# Patient Record
Sex: FEMALE | Race: WHITE | NOT HISPANIC OR LATINO | Employment: FULL TIME | URBAN - METROPOLITAN AREA
[De-identification: names, ages, dates, MRNs, and addresses within clinical notes are randomized per-mention and may not be internally consistent; named-entity substitution may affect disease eponyms.]

---

## 2018-01-12 ENCOUNTER — OFFICE VISIT (OUTPATIENT)
Dept: MEDICAL GROUP | Facility: MEDICAL CENTER | Age: 53
End: 2018-01-12
Payer: COMMERCIAL

## 2018-01-12 VITALS
TEMPERATURE: 97.4 F | RESPIRATION RATE: 16 BRPM | DIASTOLIC BLOOD PRESSURE: 68 MMHG | OXYGEN SATURATION: 100 % | SYSTOLIC BLOOD PRESSURE: 100 MMHG | HEART RATE: 64 BPM | WEIGHT: 128 LBS | HEIGHT: 66 IN | BODY MASS INDEX: 20.57 KG/M2

## 2018-01-12 DIAGNOSIS — E55.9 VITAMIN D DEFICIENCY: ICD-10-CM

## 2018-01-12 DIAGNOSIS — Z13.1 SCREENING FOR DIABETES MELLITUS: ICD-10-CM

## 2018-01-12 DIAGNOSIS — E78.5 DYSLIPIDEMIA: ICD-10-CM

## 2018-01-12 DIAGNOSIS — B00.1 RECURRENT COLD SORES: ICD-10-CM

## 2018-01-12 DIAGNOSIS — F34.1 DYSTHYMIA: ICD-10-CM

## 2018-01-12 DIAGNOSIS — Z13.29 SCREENING FOR THYROID DISORDER: ICD-10-CM

## 2018-01-12 DIAGNOSIS — Z12.11 SCREENING FOR COLON CANCER: ICD-10-CM

## 2018-01-12 DIAGNOSIS — Z13.0 SCREENING FOR DEFICIENCY ANEMIA: ICD-10-CM

## 2018-01-12 PROCEDURE — 99204 OFFICE O/P NEW MOD 45 MIN: CPT | Performed by: FAMILY MEDICINE

## 2018-01-12 RX ORDER — CITALOPRAM 40 MG/1
40 TABLET ORAL DAILY
Qty: 90 TAB | Refills: 3 | Status: SHIPPED | OUTPATIENT
Start: 2018-01-12 | End: 2019-01-06 | Stop reason: SDUPTHER

## 2018-01-12 RX ORDER — MINOCYCLINE HYDROCHLORIDE 50 MG/1
50 TABLET ORAL 2 TIMES DAILY
COMMUNITY
End: 2018-03-16 | Stop reason: SDUPTHER

## 2018-01-12 RX ORDER — VALACYCLOVIR HYDROCHLORIDE 1 G/1
1000 TABLET, FILM COATED ORAL 2 TIMES DAILY
COMMUNITY
End: 2018-01-12 | Stop reason: SDUPTHER

## 2018-01-12 RX ORDER — VALACYCLOVIR HYDROCHLORIDE 500 MG/1
500 TABLET, FILM COATED ORAL 2 TIMES DAILY
Qty: 12 TAB | Refills: 1 | Status: SHIPPED | OUTPATIENT
Start: 2018-01-12 | End: 2018-01-19 | Stop reason: SDUPTHER

## 2018-01-12 RX ORDER — CITALOPRAM 40 MG/1
40 TABLET ORAL DAILY
COMMUNITY
End: 2018-01-12 | Stop reason: SDUPTHER

## 2018-01-12 ASSESSMENT — PATIENT HEALTH QUESTIONNAIRE - PHQ9: CLINICAL INTERPRETATION OF PHQ2 SCORE: 0

## 2018-01-12 NOTE — ASSESSMENT & PLAN NOTE
Stable. Currently taking Citalopram 40 mg as prescribed.   Denies side effects and is tolerating well.  Mood is improved with current medication and therapy.    Patient denies SI/HI.  Depression Screen (PHQ-2/PHQ-9) 1/12/2018   PHQ-2 Total Score 0     She has been on for 10 years.  There is a strong FH of depression and suicide.  She has tried to wean and depression has returned.  Happy about her recent move from [place].

## 2018-01-12 NOTE — PATIENT INSTRUCTIONS
"Lipoproteins  Lipoproteins are transport carriers made of protein and different types of fat (cholesterol). In small amounts, cholesterol is important because it is used to form cell membranes and certain hormones. Cholesterol is also needed for other essential body functions. Cholesterol, which is a soft, waxy substance, does not mix with blood, which is watery. Cholesterol is transported in the blood via lipoproteins. High levels of certain lipoproteins can increase your risk of heart disease and stroke because they attach to, and build up on, artery walls. These fatty deposits make it difficult for blood to flow through the arteries.  There are 3 types of lipoproteins:  · Low Density liprotein (LDL):  · LDL or \"bad\" cholesterol carries cholesterol particles throughout the blood stream. LDL cholesterol builds up on the artery walls, making them hard and narrow. The more LDL cholesterol you have in your blood, the greater your risk of heart disease. LDL levels less than 100 mg/dL are optimal.  · High Density Lipoprotein (HDL):  · HDL or \"good\" cholesterol helps to protect against heart attack. Low levels of HDL (less than 40 mg/dL in men and less than 50 mg/dL in women) can increase the risk of heart disease. HDL attaches itself to excess cholesterol and takes it to the liver. From there, the excess cholesterol is processed and excreted from the body. An optimal HDL level is 60 mg/dL and above.  · Very Low Density Lipoprotein (VLDL):  · This type of cholesterol contains a specific type of fat (triglycerides). VLDL cholesterol makes LDL cholesterol particles larger and is considered a \"bad\" fat. A high level of VLDL can increase the risk of heart disease. VLDL levels less than 30 mg/dL are optimal.  DIAGNOSIS   The above levels are determined through blood testing. Your caregiver will draw your blood and look at your levels. If your LDL and VLDL levels are high and your HDL is low, your caregiver may " recommend:  · Medicine.  · Weight loss.  · A change in eating habits.  Immediate measures you can take are:  · Quitting smoking.  · Exercising. Excess weight can lead to numerous health problems.  · Eating a healthy diet. Focus on whole grain breads, lean meats, fresh fruits, and vegetables. Avoid fast food, fried food, and high fat food.  · Drinking alcohol only in moderation. A man should limit his alcoholic intake to 2 drinks a day. A woman should limit her alcoholic intake to 1 drink a day.  · Taking cholesterol lowering medicines as instructed (if prescribed). Make sure you follow up with your caregiver as instructed.  MAKE SURE YOU:   · Understand these instructions.  · Will watch your condition.  · Will get help right away if you are not doing well or get worse.  Document Released: 03/16/2010 Document Revised: 06/18/2013 Document Reviewed: 03/16/2010  [employer]® Patient Information ©2014 [employer].

## 2018-01-15 NOTE — PROGRESS NOTES
Chief Complaint   Patient presents with   • Establish Care         Rosalva Cummings is a 52 y.o. female here to establish care and for evaluation and management of:        HPI:    Dysthymia  Stable. Currently taking Citalopram 40 mg as prescribed.   Denies side effects and is tolerating well.  Mood is improved with current medication and therapy.    Patient denies SI/HI.  Depression Screen (PHQ-2/PHQ-9) 1/12/2018   PHQ-2 Total Score 0     She has been on for 10 years.  There is a strong FH of depression and suicide.  She has tried to wean and depression has returned.  Happy about [place] recent [place]        Recurrent cold sores  Gets rare outbreaks and Valtrex works well. She would like a refill of this medication.    Dyslipidemia  Patient has a history of dyslipidemia. She is not taking medications for this but wishes to manage with diet and exercise if possible.    Vitamin D deficiency  Patient has known vitamin D deficiency. She has not been taking vitamin D supplements regularly. She denies any recent fractures.      Allergies   Allergen Reactions   • Amoxicillin Rash     Per patient   • Codeine        Current medicines (including changes today)  Current Outpatient Prescriptions   Medication Sig Dispense Refill   • minocycline (DYNACIN) 50 MG tablet Take 50 mg by mouth 2 times a day.     • citalopram (CELEXA) 40 MG Tab Take 1 Tab by mouth every day. 90 Tab 3   • valacyclovir (VALTREX) 500 MG Tab Take 1 Tab by mouth 2 times a day for 3 days. 12 Tab 1     No current facility-administered medications for this visit.      She  has a past medical history of Acne; Depression; and Ovarian cyst.  She  has a past surgical history that includes hernia repair (Right) and ovarian cystectomy (Right).  Social History   Substance Use Topics   • Smoking status: Never Smoker   • Smokeless tobacco: Never Used   • Alcohol use No     Social History     Social History Narrative   • No narrative on file     Family History  "  Problem Relation Age of Onset   • Stroke Mother    • Psychiatry Mother    • Lung Disease Father    • Alcohol/Drug Father    • Alcohol/Drug Brother    • Cancer Brother      leukemia     Family Status   Relation Status   • Mother    • Father    • Sister Alive   • Brother    • Brother Alive         ROS  No fever or chills.  No nausea or vomiting.  No chest pain or palpitations.  No cough or SOB.  No pain with urination or hematuria.  No black or bloody stools.  All other systems reviewed and are negative     Objective:     Blood pressure 100/68, pulse 64, temperature 36.3 °C (97.4 °F), resp. rate 16, height 1.676 m (5' 6\"), weight 58.1 kg (128 lb), SpO2 100 %. Body mass index is 20.66 kg/m².  Physical Exam:      Well developed, well nourished.  Alert, oriented in no acute distress.  Psych: Eye contact is good, speech goal directed, affect calm  Eyes: conjunctiva non-injected, sclera non-icteric.  Ears: Pinna normal. TM pearly gray.   Nose: Nares are patent.  Normal mucosa  Mouth: Oral mucous membranes pink and moist with no lesions.  Neck Supple.  No adenopathy or masses in the neck or supraclavicular regions. No thyromegaly  Lungs: clear to auscultation bilaterally with good excursion. No wheezes or rhonchi  CV: regular rate and rhythm. No murmur      Assessment and Plan:   The following treatment plan was discussed    1. Dysthymia  Stable. Continue Celexa 40 mg daily  - citalopram (CELEXA) 40 MG Tab; Take 1 Tab by mouth every day.  Dispense: 90 Tab; Refill: 3    2. Screening for colon cancer  FIT test given. Patient refuses colonoscopy  - OCCULT BLOOD FECES IMMUNOASSAY (FIT); Future    3. Recurrent cold sores  Valtrex refilled for occasional use  - valacyclovir (VALTREX) 500 MG Tab; Take 1 Tab by mouth 2 times a day for 3 days.  Dispense: 12 Tab; Refill: 1    4. Vitamin D deficiency  Check vitamin D level and adjust supplementation as needed  - VITAMIN D,25 HYDROXY; Future    5. " Dyslipidemia  Dietary counseling done. Check lipid panel  - LIPID PROFILE; Future    6. Screening for deficiency anemia  Screening labs ordered.  Await results for counseling.    - CBC WITH DIFFERENTIAL; Future    7. Screening for diabetes mellitus  Screening labs ordered.  Await results for counseling.    - COMP METABOLIC PANEL; Future    8. Screening for thyroid disorder  Screening labs ordered.  Await results for counseling.    - TSH; Future      Records requested.    Any change or worsening of signs or symptoms, patient encouraged to follow-up or report to the emergency room for further evaluation. Patient understands and agrees.    Followup: Return in about 1 year (around 1/12/2019).

## 2018-01-15 NOTE — ASSESSMENT & PLAN NOTE
Patient has a history of dyslipidemia. She is not taking medications for this but wishes to manage with diet and exercise if possible.

## 2018-01-15 NOTE — ASSESSMENT & PLAN NOTE
Patient has known vitamin D deficiency. She has not been taking vitamin D supplements regularly. She denies any recent fractures.

## 2018-01-19 DIAGNOSIS — B00.1 RECURRENT COLD SORES: ICD-10-CM

## 2018-01-20 RX ORDER — VALACYCLOVIR HYDROCHLORIDE 500 MG/1
500 TABLET, FILM COATED ORAL 2 TIMES DAILY
Qty: 12 TAB | Refills: 1 | Status: SHIPPED | OUTPATIENT
Start: 2018-01-20 | End: 2018-01-23

## 2018-01-25 ENCOUNTER — TELEPHONE (OUTPATIENT)
Dept: MEDICAL GROUP | Facility: MEDICAL CENTER | Age: 53
End: 2018-01-25

## 2018-01-25 DIAGNOSIS — B00.1 RECURRENT COLD SORES: ICD-10-CM

## 2018-01-25 RX ORDER — VALACYCLOVIR HYDROCHLORIDE 1 G/1
2000 TABLET, FILM COATED ORAL 2 TIMES DAILY
Qty: 12 TAB | Refills: 6 | Status: SHIPPED | OUTPATIENT
Start: 2018-01-25 | End: 2019-12-20

## 2018-01-25 NOTE — TELEPHONE ENCOUNTER
Pt called asked if you can resent the valtrex to the local pharmacy([facility]).  Please call pt when complete

## 2018-03-16 RX ORDER — MINOCYCLINE HYDROCHLORIDE 50 MG/1
50 TABLET ORAL 2 TIMES DAILY
Qty: 60 TAB | Refills: 3 | Status: SHIPPED | OUTPATIENT
Start: 2018-03-16 | End: 2018-03-21

## 2018-03-20 RX ORDER — MINOCYCLINE HYDROCHLORIDE 50 MG/1
CAPSULE ORAL
COMMUNITY
Start: 2018-01-02 | End: 2018-03-20 | Stop reason: SDUPTHER

## 2018-03-20 NOTE — TELEPHONE ENCOUNTER
Was the patient seen in the last year in this department? Yes     Does patient have an active prescription for medications requested? No     Received Request Via: Pharmacy     We received a letter from the pharmacy stating it is cheaper for patient to get cap then tab. Would like rx changed,

## 2018-03-21 RX ORDER — MINOCYCLINE HYDROCHLORIDE 50 MG/1
50 CAPSULE ORAL 2 TIMES DAILY
Qty: 180 CAP | Refills: 1 | Status: SHIPPED | OUTPATIENT
Start: 2018-03-21 | End: 2019-01-29 | Stop reason: SDUPTHER

## 2018-05-14 ENCOUNTER — OFFICE VISIT (OUTPATIENT)
Dept: MEDICAL GROUP | Facility: MEDICAL CENTER | Age: 53
End: 2018-05-14
Payer: COMMERCIAL

## 2018-05-14 VITALS
HEIGHT: 66 IN | BODY MASS INDEX: 20.25 KG/M2 | WEIGHT: 126 LBS | HEART RATE: 68 BPM | DIASTOLIC BLOOD PRESSURE: 70 MMHG | SYSTOLIC BLOOD PRESSURE: 86 MMHG | TEMPERATURE: 99.5 F | OXYGEN SATURATION: 98 %

## 2018-05-14 DIAGNOSIS — R59.0 CERVICAL LYMPHADENOPATHY: ICD-10-CM

## 2018-05-14 DIAGNOSIS — J30.1 ACUTE SEASONAL ALLERGIC RHINITIS DUE TO POLLEN: ICD-10-CM

## 2018-05-14 DIAGNOSIS — G44.52 NEW DAILY PERSISTENT HEADACHE: ICD-10-CM

## 2018-05-14 PROCEDURE — 99214 OFFICE O/P EST MOD 30 MIN: CPT | Performed by: NURSE PRACTITIONER

## 2018-05-14 RX ORDER — MELOXICAM 7.5 MG/1
7.5 TABLET ORAL DAILY
Qty: 14 TAB | Refills: 0 | Status: SHIPPED
Start: 2018-05-14 | End: 2020-06-09
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
## 2018-05-22 DIAGNOSIS — R59.0 CERVICAL LYMPHADENOPATHY: ICD-10-CM

## 2018-05-30 ENCOUNTER — HOSPITAL ENCOUNTER (OUTPATIENT)
Dept: RADIOLOGY | Facility: MEDICAL CENTER | Age: 53
End: 2018-05-30
Attending: NURSE PRACTITIONER
Payer: COMMERCIAL

## 2018-05-30 ENCOUNTER — TELEPHONE (OUTPATIENT)
Dept: MEDICAL GROUP | Facility: MEDICAL CENTER | Age: 53
End: 2018-05-30

## 2018-05-30 DIAGNOSIS — R59.0 CERVICAL LYMPHADENOPATHY: ICD-10-CM

## 2018-05-30 PROCEDURE — 76536 US EXAM OF HEAD AND NECK: CPT

## 2018-05-30 NOTE — LETTER
June 7, 2018        Rosalva Cummings  [address] [facility] [place] [address]        Dear Rosalva:    We attempted to contact you multiple times via phone and unfortunately, were unable to speak with you.    We wanted to inform you that your neck ultrasound came back within normal limits (normal.)      If you have any questions or concerns, please don't hesitate to call.        Sincerely,        Tracy Newton  Practice Supervisor     Electronically Signed

## 2018-09-12 ENCOUNTER — PATIENT MESSAGE (OUTPATIENT)
Dept: MEDICAL GROUP | Facility: MEDICAL CENTER | Age: 53
End: 2018-09-12

## 2018-09-12 DIAGNOSIS — F34.1 DYSTHYMIA: ICD-10-CM

## 2019-01-06 DIAGNOSIS — F34.1 DYSTHYMIA: ICD-10-CM

## 2019-01-09 RX ORDER — CITALOPRAM 40 MG/1
TABLET ORAL
Qty: 90 TAB | Refills: 1 | Status: SHIPPED | OUTPATIENT
Start: 2019-01-09 | End: 2019-08-27 | Stop reason: SDUPTHER

## 2019-02-07 ENCOUNTER — OFFICE VISIT (OUTPATIENT)
Dept: MEDICAL GROUP | Facility: LAB | Age: 54
End: 2019-02-07
Payer: COMMERCIAL

## 2019-02-07 VITALS
DIASTOLIC BLOOD PRESSURE: 64 MMHG | OXYGEN SATURATION: 100 % | RESPIRATION RATE: 20 BRPM | BODY MASS INDEX: 20.41 KG/M2 | HEART RATE: 56 BPM | HEIGHT: 66 IN | TEMPERATURE: 98 F | SYSTOLIC BLOOD PRESSURE: 104 MMHG | WEIGHT: 127 LBS

## 2019-02-07 DIAGNOSIS — Z12.11 SCREENING FOR COLON CANCER: ICD-10-CM

## 2019-02-07 DIAGNOSIS — Z12.31 ENCOUNTER FOR SCREENING MAMMOGRAM FOR BREAST CANCER: ICD-10-CM

## 2019-02-07 DIAGNOSIS — E78.5 DYSLIPIDEMIA: ICD-10-CM

## 2019-02-07 DIAGNOSIS — Z11.59 NEED FOR HEPATITIS C SCREENING TEST: ICD-10-CM

## 2019-02-07 DIAGNOSIS — F34.1 DYSTHYMIA: ICD-10-CM

## 2019-02-07 DIAGNOSIS — Z13.0 SCREENING FOR DEFICIENCY ANEMIA: ICD-10-CM

## 2019-02-07 DIAGNOSIS — Z23 NEED FOR VACCINATION: ICD-10-CM

## 2019-02-07 DIAGNOSIS — E55.9 VITAMIN D DEFICIENCY: ICD-10-CM

## 2019-02-07 DIAGNOSIS — Z13.29 SCREENING FOR THYROID DISORDER: ICD-10-CM

## 2019-02-07 DIAGNOSIS — Z13.1 SCREENING FOR DIABETES MELLITUS: ICD-10-CM

## 2019-02-07 PROCEDURE — 99214 OFFICE O/P EST MOD 30 MIN: CPT | Performed by: FAMILY MEDICINE

## 2019-02-12 NOTE — PROGRESS NOTES
"Subjective:     Chief Complaint   Patient presents with   • Follow-Up       Rosalva Cummings is a 54 y.o. female here today for evaluation and management of:    Dyslipidemia  Patient has dyslipidemia but would like to manage with diet and exercise and avoid medications if possible.    Dysthymia  Stable. Currently taking citalopram 40 mg as prescribed.   Denies side effects and is tolerating well.  Mood is improved with current medication and therapy.    Patient denies SI/HI.  Depression Screen (PHQ-2/PHQ-9) 1/12/2018   PHQ-2 Total Score 0           Vitamin D deficiency  Patient has a history of vitamin D deficiency but has not been supplementing regularly.       Allergies   Allergen Reactions   • Amoxicillin Rash     Per patient   • Codeine        Current medicines (including changes today)  Current Outpatient Prescriptions   Medication Sig Dispense Refill   • minocycline (MINOCIN) 50 MG Cap TAKE 1 CAPSULE BY MOUTH  TWICE DAILY 180 Cap 0   • citalopram (CELEXA) 40 MG Tab TAKE 1 TABLET BY MOUTH   ONE TIME A DAY 90 Tab 1   • meloxicam (MOBIC) 7.5 MG Tab Take 1 Tab by mouth every day. 14 Tab 0     No current facility-administered medications for this visit.        She  has a past medical history of Acne; Depression; and Ovarian cyst.    Patient Active Problem List    Diagnosis Date Noted   • Recurrent cold sores 01/12/2018   • Ovarian cyst 01/01/2013   • Vitamin D deficiency 01/01/2013   • Dyslipidemia 08/15/2012   • Dysthymia 08/03/2012   • Acne vulgaris 06/04/2009       ROS   No fever or chills.  No nausea or vomiting.  No chest pain or palpitations.  No cough or SOB.  No pain with urination or hematuria.  No black or bloody stools.       Objective:     Blood pressure 104/64, pulse (!) 56, temperature 36.7 °C (98 °F), temperature source Temporal, resp. rate 20, height 1.676 m (5' 6\"), weight 57.6 kg (127 lb), SpO2 100 %, not currently breastfeeding. Body mass index is 20.5 kg/m².   Physical Exam:  Well developed, well " nourished.  Alert, oriented in no acute distress.  Eye contact is good, speech goal directed, affect calm  Eyes: conjunctiva non-injected, sclera non-icteric.  Neck Supple.  No adenopathy or masses in the neck or supraclavicular regions. No thyromegaly  Lungs: clear to auscultation bilaterally with good excursion. No wheezes or rhonchi  CV: regular rate and rhythm. No murmur  Abdomen: soft, nontender, no masses or organomegaly.  No rebound or guarding  Ext: no edema, color normal, vascularity normal, temperature normal          Assessment and Plan:   The following treatment plan was discussed    1. Dyslipidemia  Low-fat diet discussed.  Patient education materials given.  Increase exercise.  Recheck labs in 6 months.  - Lipid Profile; Future    2. Vitamin D deficiency  Supplement with 2000 units daily.  Recheck vitamin D level  - VITAMIN D,25 HYDROXY; Future    3. Need for vaccination  Prescription for shingrix given as we do not have in stock.  - Zoster Vac Recomb Adjuvanted (SHINGRIX) 50 MCG Recon Susp; 0.5 mL by Intramuscular route Once for 1 dose.  Dispense: 0.5 mL; Refill: 1    4. Encounter for screening mammogram for breast cancer    - MA-SCREENING MAMMO BILAT W/TOMOSYNTHESIS W/CAD; Future    5. Screening for deficiency anemia  Screening labs ordered.  Await results for counseling.  - CBC WITH DIFFERENTIAL; Future    6. Screening for diabetes mellitus  Screening labs ordered.  Await results for counseling.  - Comp Metabolic Panel; Future    7. Screening for thyroid disorder  Screening labs ordered.  Await results for counseling.  - TSH; Future  - FREE THYROXINE; Future    8. Need for hepatitis C screening test  Screening labs ordered.  Await results for counseling.  - HEP C VIRUS ANTIBODY; Future    9. Dysthymia  Continue citalopram 40 mg daily.  Behavioral modifications discussed    10. Screening for colon cancer  Patient to be sent kit.  - COLOGUARD (FIT DNA)    Any change or worsening of signs or symptoms,  patient encouraged to follow-up or report to the emergency room for further evaluation. Patient understands and agrees.    Followup: Return in about 1 year (around 2/7/2020).

## 2019-02-12 NOTE — ASSESSMENT & PLAN NOTE
Patient has dyslipidemia but would like to manage with diet and exercise and avoid medications if possible.

## 2019-02-12 NOTE — ASSESSMENT & PLAN NOTE
Stable. Currently taking citalopram 40 mg as prescribed.   Denies side effects and is tolerating well.  Mood is improved with current medication and therapy.    Patient denies SI/HI.  Depression Screen (PHQ-2/PHQ-9) 1/12/2018   PHQ-2 Total Score 0

## 2019-02-22 ENCOUNTER — HOSPITAL ENCOUNTER (OUTPATIENT)
Dept: RADIOLOGY | Facility: MEDICAL CENTER | Age: 54
End: 2019-02-22
Attending: FAMILY MEDICINE
Payer: COMMERCIAL

## 2019-02-22 DIAGNOSIS — Z12.31 ENCOUNTER FOR SCREENING MAMMOGRAM FOR BREAST CANCER: ICD-10-CM

## 2019-02-22 PROCEDURE — 77063 BREAST TOMOSYNTHESIS BI: CPT

## 2019-03-13 ENCOUNTER — HOSPITAL ENCOUNTER (OUTPATIENT)
Dept: RADIOLOGY | Facility: MEDICAL CENTER | Age: 54
End: 2019-03-13

## 2019-04-30 ENCOUNTER — HOSPITAL ENCOUNTER (OUTPATIENT)
Dept: RADIOLOGY | Facility: MEDICAL CENTER | Age: 54
End: 2019-04-30
Attending: FAMILY MEDICINE
Payer: COMMERCIAL

## 2019-04-30 ENCOUNTER — OFFICE VISIT (OUTPATIENT)
Dept: MEDICAL GROUP | Facility: LAB | Age: 54
End: 2019-04-30
Payer: COMMERCIAL

## 2019-04-30 VITALS
WEIGHT: 132 LBS | TEMPERATURE: 97.2 F | BODY MASS INDEX: 21.21 KG/M2 | OXYGEN SATURATION: 100 % | DIASTOLIC BLOOD PRESSURE: 72 MMHG | HEIGHT: 66 IN | HEART RATE: 68 BPM | SYSTOLIC BLOOD PRESSURE: 108 MMHG | RESPIRATION RATE: 20 BRPM

## 2019-04-30 DIAGNOSIS — M79.644 PAIN OF FINGER OF RIGHT HAND: ICD-10-CM

## 2019-04-30 PROCEDURE — 99214 OFFICE O/P EST MOD 30 MIN: CPT | Performed by: FAMILY MEDICINE

## 2019-04-30 PROCEDURE — 73140 X-RAY EXAM OF FINGER(S): CPT | Mod: RT

## 2019-05-01 NOTE — PATIENT INSTRUCTIONS
Finger Sprain  A finger sprain is an injury to one of the strong bands of tissue (ligaments) that connect the bones in the finger. The ligament can be stretched too much, or it can tear. A tear can be either partial or complete. The severity of the sprain depends on how much of the ligament was damaged or torn.  CAUSES  This injury is often caused by a fall or an accident. For example, if you extend your hands to catch an object or to protect yourself during a fall, the force of impact may cause the ligaments in your finger to stretch too much.  RISK FACTORS  The following factors may make you more likely to have this injury:  · Playing sports that involve a greater risk of falling, such as skiing.  · Playing sports that involve catching an object, such as basketball.  · Having poor strength and flexibility.  SYMPTOMS  Symptoms of this condition include:  · Pain at the affected finger joint, especially when bending or extending the finger.  · Loss of motion in the finger.  · Swelling.  · Tenderness.  · Bruising.  DIAGNOSIS  This condition is diagnosed with a medical history and physical exam. You may also have an X-ray of your finger to rule out a fracture or dislocation.  TREATMENT  Treatment varies depending on the severity of the sprain. If your ligament is overstretched or partially torn, treatment usually involves:  · Keeping the finger in a fixed position (immobilization) for a period of time. To help you do this, your health care provider may apply a bandage, splint, or cast to keep the finger from moving until it heals. In some cases, the finger may be taped to the fingers beside it (cesia taping).  · Taking medicines for pain.  · Doing exercises for the finger after it has begun to heal.  If your ligament is fully torn, you may need surgery to reconnect the ligament to the bone. After surgery, a cast or splint will be applied.  HOME CARE INSTRUCTIONS  If You Have a Splint:   · Wear the splint as told by  your health care provider. Remove it only as told by your health care provider.  · Loosen the splint if your fingers tingle, become numb, or turn cold and blue.  · Do not let your splint get wet if it is not waterproof.  · Keep the splint clean.  If You Have a Cast:   · Do not stick anything inside the cast to scratch your skin. Doing that increases your risk of infection.  · Check the skin around the cast every day. Tell your health care provider about any concerns.  · You may put lotion on dry skin around the edges of the cast. Do not put lotion on the skin underneath the cast.  · Do not let your cast get wet if it is not waterproof.  · Keep the cast clean.  Bathing   · If your splint or cast is not waterproof, cover it with a watertight plastic bag when you take a bath or a shower.  · Keep any bandages (dressings) dry until your health care provider says they can be removed.  Managing Pain, Stiffness, and Swelling   · If directed, put ice on the injured area:  ¨ Put ice in a plastic bag.  ¨ Place a towel between your skin and the bag.  ¨ Leave the ice on for 20 minutes, 2-3 times a day.  · Move your fingers often to avoid stiffness and to lessen swelling.  · Raise (elevate) the injured area above the level of your heart while you are sitting or lying down.  General Instructions   · Do not put pressure on any part of the cast or splint until it is fully hardened. This may take several hours.  · Take over-the-counter and prescription medicines only as told by your health care provider.  · Do not drive or operate heavy machinery while taking prescription pain medicine.  · Do exercises as told by your health care provider or physical therapist.  · Do not wear rings on your injured finger.  · Keep all follow-up visits as told by your health care provider. This is important.  SEEK MEDICAL CARE IF:  · Your pain is not controlled with medicine.  · Your bruising or swelling gets worse.  · Your cast or splint is  damaged.  · Your finger is numb or blue.  · Your finger feels colder than normal.  This information is not intended to replace advice given to you by your health care provider. Make sure you discuss any questions you have with your health care provider.  Document Released: 01/25/2006 Document Revised: 04/10/2017 Document Reviewed: 10/27/2016  [employer] Interactive Patient Education © 2017 [employer]  Finger Fracture  Fractures of fingers are breaks in the bones of the fingers. There are many types of fractures. There are different ways of treating these fractures. Your health care provider will discuss the best way to treat your fracture.  What are the causes?  Traumatic injury is the main cause of broken fingers. These include:  · Injuries while playing sports.  · Workplace injuries.  · Falls.  What increases the risk?  Activities that can increase your risk of finger fractures include:  · Sports.  · Workplace activities that involve machinery.  · A condition called osteoporosis, which can make your bones less dense and cause them to fracture more easily.  What are the signs or symptoms?  The main symptoms of a broken finger are pain and swelling within 15 minutes after the injury. Other symptoms include:  · Bruising of your finger.  · Stiffness of your finger.  · Numbness of your finger.  · Exposed bones (compound fracture) if the fracture is severe.  How is this diagnosed?  The best way to diagnose a broken bone is with X-ray imaging. Additionally, your health care provider will use this X-ray image to evaluate the position of the broken finger bones.  How is this treated?  Finger fractures can be treated with:  · Nonreduction--This means the bones are in place. The finger is splinted without changing the positions of the bone pieces. The splint is usually left on for about a week to 10 days. This will depend on your fracture and what your health care provider thinks.  · Closed reduction--The bones are put back  into position without using surgery. The finger is then splinted.  · Open reduction and internal fixation--The fracture site is opened. Then the bone pieces are fixed into place with pins or some type of hardware. This is seldom required. It depends on the severity of the fracture.  Follow these instructions at home:  · Follow your health care provider's instructions regarding activities, exercises, and physical therapy.  · Only take over-the-counter or prescription medicines for pain, discomfort, or fever as directed by your health care provider.  Contact a health care provider if:  You have pain or swelling that limits the motion or use of your fingers.  Get help right away if:  Your finger becomes numb.  This information is not intended to replace advice given to you by your health care provider. Make sure you discuss any questions you have with your health care provider.  Document Released: 04/01/2002 Document Revised: 05/25/2017 Document Reviewed: 07/30/2014  [employer] Interactive Patient Education © 2017 [employer]

## 2019-05-01 NOTE — PROGRESS NOTES
"Subjective:     Chief Complaint   Patient presents with   • Finger Injury     R Ring Finger x 3 weeks; injured it while picking up dog        Rosalva Cummings is a 54 y.o. female here today for evaluation and management of:    1. Pain of finger of right hand  3 weeks ago patient was playing with her dog at the dog park.  An unknown dog came nearby and she lifted up her 65 pound 8-month-old puppy and jammed her right ring finger.  It initially was black and blue and very certain sore to touch.  She did bring a photo with her.  She reports that it is improving but she continues to have DIP joint of the right ring finger        Allergies   Allergen Reactions   • Amoxicillin Rash     Per patient   • Codeine        Current medicines (including changes today)  Current Outpatient Prescriptions   Medication Sig Dispense Refill   • minocycline (MINOCIN) 50 MG Cap TAKE 1 CAPSULE BY MOUTH  TWICE DAILY 180 Cap 0   • citalopram (CELEXA) 40 MG Tab TAKE 1 TABLET BY MOUTH   ONE TIME A DAY 90 Tab 1   • meloxicam (MOBIC) 7.5 MG Tab Take 1 Tab by mouth every day. 14 Tab 0     No current facility-administered medications for this visit.        She  has a past medical history of Acne; Depression; and Ovarian cyst.    Patient Active Problem List    Diagnosis Date Noted   • Recurrent cold sores 01/12/2018   • Ovarian cyst 01/01/2013   • Vitamin D deficiency 01/01/2013   • Dyslipidemia 08/15/2012   • Dysthymia 08/03/2012   • Acne vulgaris 06/04/2009       ROS   No fever or chills.  No nausea or vomiting.  No chest pain or palpitations.  No cough or SOB.  No pain with urination or hematuria.  No black or bloody stools.       Objective:     /72 (BP Location: Left arm, Patient Position: Sitting, BP Cuff Size: Adult)   Pulse 68   Temp 36.2 °C (97.2 °F) (Temporal)   Resp 20   Ht 1.676 m (5' 6\")   Wt 59.9 kg (132 lb)   SpO2 100%  Body mass index is 21.31 kg/m².   Physical Exam:  Well developed, well nourished.  Alert, oriented in no acute " distress.  Eye contact is good, speech goal directed, affect calm  Eyes: conjunctiva non-injected, sclera non-icteric.  Hand: Swelling present on the right ring DIP joint with some erythema and tenderness to palpation . No tenderness at snuffbox. Range of motion intact. Strength and sensation intact.  Good  strength. 2+ radial pulse.         Assessment and Plan:   The following treatment plan was discussed    1. Pain of finger of right hand  X-ray showed the following:No displaced left 4th finger fracture or dislocation.  Osteophytic spurring emanating from the extensor aspect of the base of the distal phalanx of the 4th finger with minimal cortical thinning. A nondisplaced fracture at this site cannot be excluded  Discussed that this could be a sprain versus a small displaced fracture.  Continue conservative therapy for the next couple weeks and if the pain persists we will refer her to hand surgeon. ice and elevation as well as NSAIDs for pain  - DX-FINGER(S) 2+ RIGHT; Future      Any change or worsening of signs or symptoms, patient encouraged to follow-up or report to the emergency room for further evaluation. Patient understands and agrees.    Followup: Return if symptoms worsen or fail to improve.

## 2019-05-09 ENCOUNTER — PATIENT MESSAGE (OUTPATIENT)
Dept: MEDICAL GROUP | Facility: MEDICAL CENTER | Age: 54
End: 2019-05-09

## 2019-05-09 DIAGNOSIS — M79.644 FINGER PAIN, RIGHT: ICD-10-CM

## 2019-08-27 DIAGNOSIS — F34.1 DYSTHYMIA: ICD-10-CM

## 2019-08-28 RX ORDER — CITALOPRAM 40 MG/1
TABLET ORAL
Qty: 90 TAB | Refills: 2 | Status: SHIPPED | OUTPATIENT
Start: 2019-08-28 | End: 2020-03-23 | Stop reason: SDUPTHER

## 2019-08-28 NOTE — TELEPHONE ENCOUNTER
Was the patient seen in the last year in this department? Yes LOV [id number]/[id number]    Does patient have an active prescription for medications requested? No     Received Request Via: Pharmacy

## 2019-12-06 NOTE — TELEPHONE ENCOUNTER
Was the patient seen in the last year in this department? Yes  4/30/19  Does patient have an active prescription for medications requested? No     Received Request Via: Pharmacy

## 2019-12-10 RX ORDER — MINOCYCLINE HYDROCHLORIDE 50 MG/1
CAPSULE ORAL
Qty: 180 CAP | Refills: 4 | Status: SHIPPED | OUTPATIENT
Start: 2019-12-10 | End: 2020-03-20 | Stop reason: SDUPTHER

## 2019-12-24 RX ORDER — VALACYCLOVIR HYDROCHLORIDE 1 G/1
TABLET, FILM COATED ORAL
Qty: 12 TAB | Refills: 0 | Status: SHIPPED | OUTPATIENT
Start: 2019-12-24 | End: 2020-02-18

## 2020-02-18 RX ORDER — VALACYCLOVIR HYDROCHLORIDE 1 G/1
TABLET, FILM COATED ORAL
Qty: 12 TAB | Refills: 0 | Status: SHIPPED | OUTPATIENT
Start: 2020-02-18 | End: 2020-03-20 | Stop reason: SDUPTHER

## 2020-03-20 RX ORDER — MINOCYCLINE HYDROCHLORIDE 50 MG/1
CAPSULE ORAL
Qty: 180 CAP | Refills: 0 | Status: SHIPPED
Start: 2020-03-20 | End: 2021-01-04 | Stop reason: SDUPTHER

## 2020-03-20 RX ORDER — VALACYCLOVIR HYDROCHLORIDE 1 G/1
TABLET, FILM COATED ORAL
Qty: 24 TAB | Refills: 0 | Status: SHIPPED
Start: 2020-03-20

## 2020-03-20 NOTE — TELEPHONE ENCOUNTER
----- Message from Rosalva Cummings sent at 3/19/2020  6:24 PM PDT -----  Regarding: Prescription Question  Contact: [phone]  Elton Gordon. I hope that you are doing ok among the chaos.    I am in [place] for work. I have decided to stay here for a while due to the corona chaos. I do not have enough medication with me. Can you please call in a 60-day supply of minocyline and Valtrex to a pharmacy here? Please call it into the pharmacy at [facility].  [facility] [place] [address]  Phone: [phone]    Thank you. I am sorry for the inconvenience. I appreciate your assistance.

## 2020-03-23 DIAGNOSIS — F34.1 DYSTHYMIA: ICD-10-CM

## 2020-03-23 RX ORDER — CITALOPRAM 40 MG/1
TABLET ORAL
Qty: 90 TAB | Refills: 2 | Status: SHIPPED | OUTPATIENT
Start: 2020-03-23 | End: 2020-05-27

## 2020-03-23 NOTE — TELEPHONE ENCOUNTER
----- Message from Rosalva Cummings sent at 3/22/2020 11:45 AM PDT -----  Regarding: RE: Prescription Question  Contact: [phone]  Elton. I am so sorry. I gave you the wrong name of the medication I need refilled. I need my anti-depressant refilled. I am in [place] and do not have the prescription bottle with me.  I do know that I take one pill a day and that it is 40 mgs. I thought that the name was Valtrex, but I was wrong. Can you please fax over a request to have my anti-depressant medication filled at [facility]. I am so so sorry for my error. Thank you. I looked it up--I think that I take citalopram (Celexa).

## 2020-05-24 DIAGNOSIS — F34.1 DYSTHYMIA: ICD-10-CM

## 2020-05-27 RX ORDER — CITALOPRAM 40 MG/1
TABLET ORAL
Qty: 90 TAB | Refills: 0 | Status: SHIPPED | OUTPATIENT
Start: 2020-05-27 | End: 2020-08-26

## 2020-06-09 ENCOUNTER — OFFICE VISIT (OUTPATIENT)
Dept: MEDICAL GROUP | Facility: LAB | Age: 55
End: 2020-06-09
Payer: COMMERCIAL

## 2020-06-09 VITALS
HEART RATE: 68 BPM | RESPIRATION RATE: 14 BRPM | WEIGHT: 136 LBS | OXYGEN SATURATION: 95 % | BODY MASS INDEX: 22.66 KG/M2 | HEIGHT: 65 IN | TEMPERATURE: 98.8 F | DIASTOLIC BLOOD PRESSURE: 64 MMHG | SYSTOLIC BLOOD PRESSURE: 104 MMHG

## 2020-06-09 DIAGNOSIS — Z13.0 SCREENING FOR DEFICIENCY ANEMIA: ICD-10-CM

## 2020-06-09 DIAGNOSIS — Z12.31 ENCOUNTER FOR SCREENING MAMMOGRAM FOR BREAST CANCER: ICD-10-CM

## 2020-06-09 DIAGNOSIS — E78.5 DYSLIPIDEMIA: ICD-10-CM

## 2020-06-09 DIAGNOSIS — L70.0 ACNE VULGARIS: ICD-10-CM

## 2020-06-09 DIAGNOSIS — F34.1 DYSTHYMIA: ICD-10-CM

## 2020-06-09 DIAGNOSIS — B00.1 RECURRENT COLD SORES: ICD-10-CM

## 2020-06-09 DIAGNOSIS — Z11.59 NEED FOR HEPATITIS C SCREENING TEST: ICD-10-CM

## 2020-06-09 DIAGNOSIS — Z12.11 SCREENING FOR COLON CANCER: ICD-10-CM

## 2020-06-09 DIAGNOSIS — Z13.1 SCREENING FOR DIABETES MELLITUS: ICD-10-CM

## 2020-06-09 DIAGNOSIS — E55.9 VITAMIN D DEFICIENCY: ICD-10-CM

## 2020-06-09 PROCEDURE — 99214 OFFICE O/P EST MOD 30 MIN: CPT | Performed by: FAMILY MEDICINE

## 2020-06-09 ASSESSMENT — ANXIETY QUESTIONNAIRES
2. NOT BEING ABLE TO STOP OR CONTROL WORRYING: SEVERAL DAYS
4. TROUBLE RELAXING: SEVERAL DAYS
1. FEELING NERVOUS, ANXIOUS, OR ON EDGE: SEVERAL DAYS
6. BECOMING EASILY ANNOYED OR IRRITABLE: SEVERAL DAYS
3. WORRYING TOO MUCH ABOUT DIFFERENT THINGS: SEVERAL DAYS
GAD7 TOTAL SCORE: 7
7. FEELING AFRAID AS IF SOMETHING AWFUL MIGHT HAPPEN: SEVERAL DAYS
5. BEING SO RESTLESS THAT IT IS HARD TO SIT STILL: SEVERAL DAYS

## 2020-06-09 ASSESSMENT — PATIENT HEALTH QUESTIONNAIRE - PHQ9
5. POOR APPETITE OR OVEREATING: 1 - SEVERAL DAYS
CLINICAL INTERPRETATION OF PHQ2 SCORE: 1
SUM OF ALL RESPONSES TO PHQ QUESTIONS 1-9: 7

## 2020-06-09 NOTE — ASSESSMENT & PLAN NOTE
Stable. Currently taking citalopram 40 mg as prescribed.   Denies side effects and is tolerating well.  Mood is improved with current medication and therapy.    Patient denies SI/HI.  Depression Screen (PHQ-2/PHQ-9) 2018   PHQ-2 Total Score 0 1   PHQ-9 Total Score - 7     MICHAEL-7 Questionnaire    Feeling nervous, anxious, or on edge: Several days  Not being able to sop or control worrying: Several days  Worrying too much about different things: Several days  Trouble relaxing: Several days  Being so restless that it's hard to sit still: Several days  Becoming easily annoyed or irritable: Several days  Feeling afraid as if something awful might happen: Several days  Total: 7    Interpretation of MICHAEL 7 Total Score   Score Severity :  0-4 No Anxiety   5-9 Mild Anxiety  10-14 Moderate Anxiety  15-21 Severe Anxiety    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

## 2020-06-10 ENCOUNTER — HOSPITAL ENCOUNTER (OUTPATIENT)
Dept: RADIOLOGY | Facility: MEDICAL CENTER | Age: 55
End: 2020-06-10
Attending: FAMILY MEDICINE
Payer: COMMERCIAL

## 2020-06-10 DIAGNOSIS — Z12.31 ENCOUNTER FOR SCREENING MAMMOGRAM FOR BREAST CANCER: ICD-10-CM

## 2020-06-10 PROCEDURE — 77067 SCR MAMMO BI INCL CAD: CPT

## 2020-06-12 NOTE — PROGRESS NOTES
Subjective:     Chief Complaint   Patient presents with   • Annual Exam       Rosalva Cummings is a 55 y.o. female here today for evaluation and management of:    Dysthymia  Stable. Currently taking citalopram 40 mg as prescribed.   Denies side effects and is tolerating well.  Mood is improved with current medication and therapy.    Patient denies SI/HI.  Depression Screen (PHQ-2/PHQ-9) 2018   PHQ-2 Total Score 0 1   PHQ-9 Total Score - 7     MICHAEL-7 Questionnaire    Feeling nervous, anxious, or on edge: Several days  Not being able to sop or control worrying: Several days  Worrying too much about different things: Several days  Trouble relaxing: Several days  Being so restless that it's hard to sit still: Several days  Becoming easily annoyed or irritable: Several days  Feeling afraid as if something awful might happen: Several days  Total: 7    Interpretation of MICHAEL 7 Total Score   Score Severity :  0-4 No Anxiety   5-9 Mild Anxiety  10-14 Moderate Anxiety  15-21 Severe Anxiety    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        She  has a past medical history of Acne, Depression, and Ovarian cyst.    Patient Active Problem List    Diagnosis Date Noted   • Recurrent cold sores 2018   • Ovarian cyst 2013   • Vitamin D deficiency 2013   • Dyslipidemia 08/15/2012   • Dysthymia 2012   • Acne vulgaris 2009       ROS   No fever or chills.  No nausea or vomiting.  No chest pain or  "palpitations.  No cough or SOB.  No pain with urination or hematuria.  No black or bloody stools.       Objective:     /64 (BP Location: Left arm, Patient Position: Sitting, BP Cuff Size: Adult)   Pulse 68   Temp 37.1 °C (98.8 °F) (Temporal)   Resp 14   Ht 1.651 m (5' 5\")   Wt 61.7 kg (136 lb)   SpO2 95%  Body mass index is 22.63 kg/m².   Physical Exam:  Well developed, well nourished.  Alert, oriented in no acute distress.  Eye contact is good, speech goal directed, affect calm  Eyes: conjunctiva non-injected, sclera non-icteric.  Neck Supple.  No adenopathy or masses in the neck or supraclavicular regions. No thyromegaly  Lungs: clear to auscultation bilaterally with good excursion. No wheezes or rhonchi  CV: regular rate and rhythm. No murmur      Assessment and Plan:   The following treatment plan was discussed    1. Dysthymia  This is a chronic medical condition that is currently stable  Continue citalopram 40 mg daily.  Behavioral modifications discussed    2. Recurrent cold sores  This is a chronic medical condition that is currently stable  Continue Valtrex for as needed use    3. Acne vulgaris  This is a chronic medical condition that is currently stable  Continue minocycline as directed    4. Dyslipidemia  Check labs.  Await results.  Mediterranean diet information given  - Lipid Profile; Future  - TSH; Future    5. Vitamin D deficiency  Check vitamin D level and adjust supplementation as needed  - VITAMIN D,25 HYDROXY; Future    6. Screening for deficiency anemia  Screening labs ordered.  Await results for counseling.  - CBC WITH DIFFERENTIAL; Future    7. Screening for diabetes mellitus  Screening labs ordered.  Await results for counseling.  - Comp Metabolic Panel; Future    8. Encounter for screening mammogram for breast cancer    - MA-SCREENING MAMMO BILAT W/TOMOSYNTHESIS W/CAD; Future    9. Screening for colon cancer    - COLOGUARD (FIT DNA)    10. Need for hepatitis C screening " test  Screening labs ordered.  Await results for counseling.  - HCV Scrn ( [id number] 1xLife); Future    Any change or worsening of signs or symptoms, patient encouraged to follow-up or report to the emergency room for further evaluation. Patient understands and agrees.    Followup: Return in about 1 year (around 2021).

## 2020-07-30 ENCOUNTER — PATIENT MESSAGE (OUTPATIENT)
Dept: MEDICAL GROUP | Facility: LAB | Age: 55
End: 2020-07-30

## 2020-08-17 ENCOUNTER — TELEMEDICINE (OUTPATIENT)
Dept: MEDICAL GROUP | Facility: LAB | Age: 55
End: 2020-08-17
Payer: COMMERCIAL

## 2020-08-17 VITALS — HEIGHT: 65 IN | WEIGHT: 136 LBS | BODY MASS INDEX: 22.66 KG/M2

## 2020-08-17 DIAGNOSIS — B35.1 FUNGAL INFECTION OF TOENAIL: ICD-10-CM

## 2020-08-17 PROCEDURE — 99213 OFFICE O/P EST LOW 20 MIN: CPT | Mod: 95,CR | Performed by: FAMILY MEDICINE

## 2020-08-17 RX ORDER — TERBINAFINE HYDROCHLORIDE 250 MG/1
250 TABLET ORAL DAILY
Qty: 7 TAB | Refills: 5 | Status: SHIPPED | OUTPATIENT
Start: 2020-08-17 | End: 2020-11-24 | Stop reason: SDUPTHER

## 2020-08-17 NOTE — PATIENT INSTRUCTIONS
Fungal Nail Infection  A fungal nail infection is a common infection of the toenails or fingernails. This condition affects toenails more often than fingernails. It often affects the great, or big, toes. More than one nail may be infected. The condition can be passed from person to person (is contagious).  What are the causes?  This condition is caused by a fungus. Several types of fungi can cause the infection. These fungi are common in moist and warm areas. If your hands or feet come into contact with the fungus, it may get into a crack in your fingernail or toenail and cause the infection.  What increases the risk?  The following factors may make you more likely to develop this condition:  · Being male.  · Being of older age.  · Living with someone who has the fungus.  · Walking barefoot in areas where the fungus thrives, such as showers or locker rooms.  · Wearing shoes and socks that cause your feet to sweat.  · Having a nail injury or a recent nail surgery.  · Having certain medical conditions, such as:  ? Athlete's foot.  ? Diabetes.  ? Psoriasis.  ? Poor circulation.  ? A weak body defense system (immune system).  What are the signs or symptoms?  Symptoms of this condition include:  · A pale spot on the nail.  · Thickening of the nail.  · A nail that becomes yellow or brown.  · A brittle or ragged nail edge.  · A crumbling nail.  · A nail that has lifted away from the nail bed.  How is this diagnosed?  This condition is diagnosed with a physical exam. Your health care provider may take a scraping or clipping from your nail to test for the fungus.  How is this treated?  Treatment is not needed for mild infections. If you have significant nail changes, treatment may include:  · Antifungal medicines taken by mouth (orally). You may need to take the medicine for several weeks or several months, and you may not see the results for a long time. These medicines can cause side effects. Ask your health care provider  what problems to watch for.  · Antifungal nail polish or nail cream. These may be used along with oral antifungal medicines.  · Laser treatment of the nail.  · Surgery to remove the nail. This may be needed for the most severe infections.  It can take a long time, usually up to a year, for the infection to go away. The infection may also come back.  Follow these instructions at home:  Medicines  · Take or apply over-the-counter and prescription medicines only as told by your health care provider.  · Ask your health care provider about using over-the-counter mentholated ointment on your nails.  Nail care  · Trim your nails often.  · Wash and dry your hands and feet every day.  · Keep your feet dry:  ? Wear absorbent socks, and change your socks frequently.  ? Wear shoes that allow air to circulate, such as sandals or canvas tennis shoes. Throw out old shoes.  · Do not use artificial nails.  · If you go to a nail salon, make sure you choose one that uses clean instruments.  · Use antifungal foot powder on your feet and in your shoes.  General instructions  · Do not share personal items, such as towels or nail clippers.  · Do not walk barefoot in shower rooms or locker rooms.  · Wear rubber gloves if you are working with your hands in wet areas.  · Keep all follow-up visits as told by your health care provider. This is important.  Contact a health care provider if:  Your infection is not getting better or it is getting worse after several months.  Summary  · A fungal nail infection is a common infection of the toenails or fingernails.  · Treatment is not needed for mild infections. If you have significant nail changes, treatment may include taking medicine orally and applying medicine to your nails.  · It can take a long time, usually up to a year, for the infection to go away. The infection may also come back.  · Take or apply over-the-counter and prescription medicines only as told by your health care  provider.  · Follow instructions for taking care of your nails to help prevent infection from coming back or spreading.  This information is not intended to replace advice given to you by your health care provider. Make sure you discuss any questions you have with your health care provider.  Document Released: 12/15/2001 Document Revised: 04/09/2020 Document Reviewed: 05/24/2019  [employer] Patient Education © 2020 [employer].  Terbinafine tablets  What is this medicine?  TERBINAFINE (TER bin a feen) is an antifungal medicine. It is used to treat certain kinds of fungal or yeast infections.  This medicine may be used for other purposes; ask your health care provider or pharmacist if you have questions.  COMMON BRAND NAME(S): Lamisil, Terbinex  What should I tell my health care provider before I take this medicine?  They need to know if you have any of these conditions:  · drink alcoholic beverages  · kidney disease  · liver disease  · an unusual or allergic reaction to terbinafine, other medicines, foods, dyes, or preservatives  · pregnant or trying to get pregnant  · breast-feeding  How should I use this medicine?  Take this medicine by mouth with a full glass of water. Follow the directions on the prescription label. You can take this medicine with food or on an empty stomach. Take your medicine at regular intervals. Do not take your medicine more often than directed. Do not skip doses or stop your medicine early even if you feel better. Do not stop taking except on your doctor's advice. Talk to your pediatrician regarding the use of this medicine in children. Special care may be needed.  Overdosage: If you think you have taken too much of this medicine contact a poison control center or emergency room at once.  NOTE: This medicine is only for you. Do not share this medicine with others.  What if I miss a dose?  If you miss a dose, take it as soon as you can. If it is almost time for your next dose, take only that  dose. Do not take double or extra doses.  What may interact with this medicine?  Do not take this medicine with any of the following medications:  · thioridazine  This medicine may also interact with the following medications:  · beta-blockers  · caffeine  · cimetidine  · cyclosporine  · medicines for depression, anxiety, or psychotic disturbances  · medicines for fungal infections like fluconazole and ketoconazole  · medicines for irregular heartbeat like amiodarone, flecainide and propafenone  · rifampin  · warfarin  This list may not describe all possible interactions. Give your health care provider a list of all the medicines, herbs, non-prescription drugs, or dietary supplements you use. Also tell them if you smoke, drink alcohol, or use illegal drugs. Some items may interact with your medicine.  What should I watch for while using this medicine?  Visit your doctor or health care provider regularly. Tell your doctor right away if you have nausea or vomiting, loss of appetite, stomach pain on your right upper side, yellow skin, dark urine, light stools, or are over tired. Some fungal infections need many weeks or months of treatment to cure. If you are taking this medicine for a long time, you will need to have important blood work done.  This medicine may cause serious skin reactions. They can happen weeks to months after starting the medicine. Contact your health care provider right away if you notice fevers or flu-like symptoms with a rash. The rash may be red or purple and then turn into blisters or peeling of the skin. Or, you might notice a red rash with swelling of the face, lips or lymph nodes in your neck or under your arms.  What side effects may I notice from receiving this medicine?  Side effects that you should report to your doctor or health care professional as soon as possible:  · allergic reactions like skin rash or hives, swelling of the face, lips, or tongue  · changes in vision  · dark  urine  · fever or infection  · general ill feeling or flu-like symptoms  · light-colored stools  · loss of appetite, nausea  · rash, fever, and swollen lymph nodes  · redness, blistering, peeling or loosening of the skin, including inside the mouth  · right upper belly pain  · unusually weak or tired  · yellowing of the eyes or skin  Side effects that usually do not require medical attention (report to your doctor or health care professional if they continue or are bothersome):  · changes in taste  · diarrhea  · hair loss  · muscle or joint pain  · stomach gas  · stomach upset  This list may not describe all possible side effects. Call your doctor for medical advice about side effects. You may report side effects to FDA at 4-321-OAT-6396.  Where should I keep my medicine?  Keep out of the reach of children.  Store at room temperature below 25 degrees C (77 degrees F). Protect from light. Throw away any unused medicine after the expiration date.  NOTE: This sheet is a summary. It may not cover all possible information. If you have questions about this medicine, talk to your doctor, pharmacist, or health care provider.  © 2020 Elsevier/Gold Standard (2020-03-27 15:37:07)

## 2020-08-17 NOTE — PROGRESS NOTES
Telemedicine Visit: Established Patient     This evaluation was conducted via Zoom, using secure and encrypted videoconferencing technology.  The patient was physical located at Home in [place] and the physician was located in [facility].  The patient was presented by self, at home.  The patient's identity was confirmed and verbal consent for the telemedicine encounter was obtained.      Subjective:   CC: toenail infection  Rosalva Cummings is a 55 y.o. female presenting for evaluation and management of:    Patient complains of an infection in her left great toe for several months.  She is tried many over-the-counter medications without effect and would like to seek treatment for this.  It sometimes tender in certain shoes.    ROS   Denies any recent fevers or chills. No nausea or vomiting. No chest pains or shortness of breath.     Allergies   Allergen Reactions   • Doxycycline Vomiting   • Hydrocodone-Acetaminophen Vomiting   • Sulfamethoxazole-Trimethoprim Rash   • Amoxicillin Rash     Per patient   • Codeine        Current medicines (including changes today)  Current Outpatient Medications   Medication Sig Dispense Refill   • terbinafine (LAMISIL) 250 MG Tab Take 1 Tab by mouth every day for 120 days. 7 Tab 5   • citalopram (CELEXA) 40 MG Tab TAKE 1 TABLET DAILY 90 Tab 0   • valacyclovir (VALTREX) 1 GM Tab TAKE TWO TABLETS BY MOUTH TWICE A DAY FOR ONE DAY *VALTREX* 24 Tab 0   • minocycline (MINOCIN) 50 MG Cap TAKE 1 CAPSULE TWICE A  Cap 0     No current facility-administered medications for this visit.        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  She  has a past surgical history that includes hernia repair (Right) and ovarian cystectomy (Right).       Objective:   Ht 1.651 m (5' 5\")   Wt 61.7 kg (136 lb)   BMI 22.63 kg/m²     Physical Exam:  Constitutional: Alert, no distress, well-groomed.  Skin: No rashes in visible areas.  Eye: Round. Conjunctiva clear, lids normal. No icterus.   ENMT: Lips pink without lesions, good dentition, moist mucous membranes. Phonation normal.  Neck: No masses, no thyromegaly. Moves freely without pain.  CV: Pulse as reported by patient  Respiratory: Unlabored respiratory effort, no cough or audible wheeze  Psych: Alert and oriented x3, normal affect and mood.   Toenail yellow and thickened with defect seen midway under medial nail with erythematous rash on the adjacent toe      Assessment and Plan:   The following treatment plan was discussed:     1. Fungal infection of toenail  - terbinafine (LAMISIL) 250 MG Tab; Take 1 Tab by mouth every day for 120 days.  Dispense: 7 Tab; Refill: 5        Follow-up: Return if symptoms worsen or fail to improve.           "

## 2020-08-21 DIAGNOSIS — F34.1 DYSTHYMIA: ICD-10-CM

## 2020-08-26 RX ORDER — CITALOPRAM 40 MG/1
TABLET ORAL
Qty: 90 TAB | Refills: 3 | Status: SHIPPED | OUTPATIENT
Start: 2020-08-26

## 2020-11-23 ENCOUNTER — PATIENT MESSAGE (OUTPATIENT)
Dept: MEDICAL GROUP | Facility: LAB | Age: 55
End: 2020-11-23

## 2020-11-23 DIAGNOSIS — B35.1 FUNGAL INFECTION OF TOENAIL: ICD-10-CM

## 2020-11-24 NOTE — TELEPHONE ENCOUNTER
From: Rosalva Cummings  To: Flor Gordon M.D.  Sent: 11/23/2020 7:34 PM PST  Subject: Prescription Question    Elton Gordon. I hope that you are well.     Can you please call in a refill for Terbinafine to [facility] on [address] in [place]? My toe is getting better, but the infection is still there. I tried to use the refill option in [facility] but was not able to request the refill.    Thank you. I hope that you have a happy and safe holiday.

## 2020-11-24 NOTE — PATIENT COMMUNICATION
Received request via: Patient    Was the patient seen in the last year in this department? Yes 8/17/2020    Does the patient have an active prescription (recently filled or refills available) for medication(s) requested? No

## 2020-11-25 RX ORDER — TERBINAFINE HYDROCHLORIDE 250 MG/1
250 TABLET ORAL DAILY
Qty: 7 TAB | Refills: 5 | Status: SHIPPED | OUTPATIENT
Start: 2020-11-25 | End: 2021-03-25

## 2020-12-07 ENCOUNTER — TELEPHONE (OUTPATIENT)
Dept: MEDICAL GROUP | Facility: LAB | Age: 55
End: 2020-12-07

## 2020-12-07 NOTE — TELEPHONE ENCOUNTER
[facility] needs a new Rx   Cig does not match -  terbinafine (LAMISIL) 250 MG Tab 7 Tab 5/5 11/25/2020 3/25/2021    Sig - Route: Take 1 Tab by mouth every day for 120 days. - Oral    Sent to pharmacy as: Terbinafine HCl 250 MG Oral Tablet (LAMISIL)    E-Prescribing Status: Receipt confirmed by pharmacy (11/25/2020 10:36 AM [facility])    Pharmacy    [id number] - [place], [place] - [address]

## 2021-01-04 ENCOUNTER — PATIENT MESSAGE (OUTPATIENT)
Dept: MEDICAL GROUP | Facility: LAB | Age: 56
End: 2021-01-04

## 2021-01-04 RX ORDER — MINOCYCLINE HYDROCHLORIDE 50 MG/1
CAPSULE ORAL
Qty: 180 CAP | Refills: 1 | Status: SHIPPED | OUTPATIENT
Start: 2021-01-04

## 2021-01-04 NOTE — TELEPHONE ENCOUNTER
From: Rosalva Cummings  To: Flor Gordon M.D.  Sent: 1/4/2021 10:27 AM PST  Subject: Prescription Question    Hello and happy new year! Can you please call in a refill for  minocycline 50 MG Caps    It should be called into [facility]....not [facility].    Thank you.

## 2021-01-04 NOTE — PATIENT COMMUNICATION
Received request via: Pharmacy    Was the patient seen in the last year in this department? Yes    Does the patient have an active prescription (recently filled or refills available) for medication(s) requested? No

## 2021-04-17 ENCOUNTER — OFFICE VISIT (OUTPATIENT)
Dept: URGENT CARE | Facility: CLINIC | Age: 56
End: 2021-04-17
Payer: COMMERCIAL

## 2021-04-17 VITALS
HEIGHT: 65 IN | RESPIRATION RATE: 12 BRPM | BODY MASS INDEX: 19.99 KG/M2 | SYSTOLIC BLOOD PRESSURE: 92 MMHG | OXYGEN SATURATION: 100 % | TEMPERATURE: 98.8 F | DIASTOLIC BLOOD PRESSURE: 64 MMHG | HEART RATE: 58 BPM | WEIGHT: 120 LBS

## 2021-04-17 DIAGNOSIS — R59.0 REACTIVE CERVICAL LYMPHADENOPATHY: ICD-10-CM

## 2021-04-17 PROBLEM — R59.1 LYMPHADENOPATHY: Status: ACTIVE | Noted: 2021-04-17

## 2021-04-17 PROBLEM — Z53.20 PAP SMEAR OF CERVIX DECLINED: Status: ACTIVE | Noted: 2017-08-25

## 2021-04-17 LAB
INT CON NEG: NORMAL
INT CON POS: NORMAL
S PYO AG THROAT QL: NEGATIVE

## 2021-04-17 PROCEDURE — 87880 STREP A ASSAY W/OPTIC: CPT | Mod: QW | Performed by: PHYSICIAN ASSISTANT

## 2021-04-17 PROCEDURE — 99214 OFFICE O/P EST MOD 30 MIN: CPT | Performed by: PHYSICIAN ASSISTANT

## 2021-04-17 RX ORDER — IBUPROFEN 800 MG/1
800 TABLET ORAL EVERY 8 HOURS PRN
Qty: 21 TABLET | Refills: 0 | Status: SHIPPED | OUTPATIENT
Start: 2021-04-17 | End: 2021-04-24

## 2021-04-17 ASSESSMENT — ENCOUNTER SYMPTOMS
SHORTNESS OF BREATH: 0
COUGH: 0
FEVER: 0
GASTROINTESTINAL NEGATIVE: 1
SORE THROAT: 0
CHILLS: 0

## 2021-04-17 NOTE — PATIENT INSTRUCTIONS
"Swollen Lymph Nodes  The lymphatic system filters fluid from around cells. It is like a system of blood vessels. These channels carry lymph instead of blood. The lymphatic system is an important part of the immune (disease fighting) system. When people talk about \"swollen glands in the neck,\" they are usually talking about swollen lymph nodes. The lymph nodes are like the little traps for infection. You and your caregiver may be able to feel lymph nodes, especially swollen nodes, in these common areas: the groin (inguinal area), armpits (axilla), and above the clavicle (supraclavicular). You may also feel them in the neck (cervical) and the back of the head just above the hairline (occipital).  Swollen glands occur when there is any condition in which the body responds with an allergic type of reaction. For instance, the glands in the neck can become swollen from insect bites or any type of minor infection on the head. These are very noticeable in children with only minor problems. Lymph nodes may also become swollen when there is a tumor or problem with the lymphatic system, such as Hodgkin's disease.  TREATMENT   · Most swollen glands do not require treatment. They can be observed (watched) for a short period of time, if your caregiver feels it is necessary. Most of the time, observation is not necessary.  · Antibiotics (medicines that kill germs) may be prescribed by your caregiver. Your caregiver may prescribe these if he or she feels the swollen glands are due to a bacterial (germ) infection. Antibiotics are not used if the swollen glands are caused by a virus.  HOME CARE INSTRUCTIONS   · Take medications as directed by your caregiver. Only take over-the-counter or prescription medicines for pain, discomfort, or fever as directed by your caregiver.  SEEK MEDICAL CARE IF:   · If you begin to run a temperature greater than 102° F (38.9° C), or as your caregiver suggests.  MAKE SURE YOU:   · Understand these " instructions.  · Will watch your condition.  · Will get help right away if you are not doing well or get worse.  Document Released: 12/08/2003 Document Revised: 03/11/2013 Document Reviewed: 12/18/2006  [employer] Patient Information ©2014 [employer].

## 2021-04-17 NOTE — NON-PROVIDER
Patient previously had similar swelling in 2018. It was imaged with no significant findings.    Patient had crown replaced on upper R side of mouth on Monday.

## 2021-04-17 NOTE — PROGRESS NOTES
Subjective:   Rosalva Cummings is a 56 y.o. female who presents for Adenopathy (x 3 days, swelling near R ear, 7/10 pain)      HPI  Patient presents the clinic with complaints of swollen and painful glands to her right side of her anterior neck and in front of her right ear onset 2 days ago.  She reports a history of dental procedure 4 days ago.  She had a crown replacement upper right molar.  She states procedure went well without complications.  Her swollen and painful glands have been gradually worsening.  Advil provides relief.  She has been taking 4 Advil pills every 3 hours.Denies any fever, chills, sore throat, cough, chest pain, SOB, inner ear pain.  Similar sore glands to right side 3 years ago and had US which showed no concerning mass.     Review of Systems   Constitutional: Negative for chills and fever.   HENT: Negative for ear discharge, ear pain and sore throat.         Swollen painful glands to right side and preauricular    Respiratory: Negative for cough and shortness of breath.    Cardiovascular: Negative for chest pain.   Gastrointestinal: Negative.    Skin: Negative.        Medications:    • citalopram Tabs  • minocycline Caps  • valacyclovir Tabs    Allergies: Doxycycline, Hydrocodone-acetaminophen, Sulfamethoxazole-trimethoprim, Amoxicillin, and Codeine    Problem List: Rosalva Cummings has Acne vulgaris; Dysthymia; Dyslipidemia; Ovarian cyst; Vitamin D deficiency; and Recurrent cold sores on their problem list.    Surgical History:  Past Surgical History:   Procedure Laterality Date   • HERNIA REPAIR Right     Femoral   • OVARIAN CYSTECTOMY Right        Past Social Hx: Rosalva Cummings  reports that she has never smoked. She has never used smokeless tobacco. She reports that she does not drink alcohol and does not use drugs.     Past Family Hx:  Rosalva Cummings family history includes Alcohol/Drug in her brother and father; Cancer in her brother; Lung Disease in her father; Psychiatric Illness in her mother;  "Stroke in her mother.     Problem list, medications, and allergies reviewed by myself today in Epic.     Objective:     BP (!) 92/64 (BP Location: Right arm, Patient Position: Sitting, BP Cuff Size: Adult)   Pulse (!) 58   Temp 37.1 °C (98.8 °F) (Temporal)   Resp 12   Ht 1.651 m (5' 5\")   Wt 54.4 kg (120 lb)   SpO2 100%   BMI 19.97 kg/m²     Physical Exam  Vitals reviewed.   Constitutional:       General: She is not in acute distress.     Appearance: Normal appearance. She is not ill-appearing or toxic-appearing.   HENT:      Head:        Comments: Mild TTP to preauricular area. No overlying skin changes or palpable mass.      Right Ear: Tympanic membrane, ear canal and external ear normal.      Left Ear: Tympanic membrane, ear canal and external ear normal.      Mouth/Throat:      Lips: Pink.      Mouth: Mucous membranes are moist.      Dentition: Normal dentition. No dental tenderness, gingival swelling, dental abscesses or gum lesions.      Pharynx: Oropharynx is clear. Uvula midline. No pharyngeal swelling, oropharyngeal exudate, posterior oropharyngeal erythema or uvula swelling.      Tonsils: No tonsillar exudate or tonsillar abscesses.   Eyes:      Conjunctiva/sclera: Conjunctivae normal.      Pupils: Pupils are equal, round, and reactive to light.   Neck:      Comments: Mild anterior cervical lymphadenopathy.   Cardiovascular:      Rate and Rhythm: Normal rate.   Pulmonary:      Effort: Pulmonary effort is normal.   Musculoskeletal:      Cervical back: Neck supple. No rigidity.   Lymphadenopathy:      Cervical: Cervical adenopathy (tender) present.      Right cervical: Superficial cervical adenopathy present. No posterior cervical adenopathy.     Left cervical: No superficial, deep or posterior cervical adenopathy.   Skin:     General: Skin is warm and dry.   Neurological:      General: No focal deficit present.      Mental Status: She is alert and oriented to person, place, and time.   Psychiatric:   "       Mood and Affect: Mood normal.         Behavior: Behavior normal.         Assessment/Associated Orders     1. Reactive cervical lymphadenopathy  ibuprofen (MOTRIN) 800 MG Tab       Medical Decision Making      This is a pleasant 56-year-old female who complains of 2 to 3 days of right-sided swollen and painful glands to her right neck and preauricular area.  Recent dental procedure 5 days ago.  Examination shows mild anterior cervical lymphadenopathy with tenderness to palpation. Strep A: Negative.   No signs of dental infection, abscess, or inflammation.  I discussed with patient I have very low suspicion for bacterial infection.  Discussed most likely reactive lymph nodes secondary to recent dental procedure.    At this point in time, recommended supportive symptomatic treatment with ibuprofen 800 mg every 8 hours as needed.  Recommended cool compresses to the area.  Soft foods. Discussed only take the ibuprofen as needed and to not use unless absolutely necessary. She may try tylenol.  Discussed medication interaction with citalopram.  Patient educated on risk of taking ibuprofen with citalopram such as GI bleeding.  Discussed signs and symptoms and indications to immediately return or present to the ER.  Patient verbalized understanding to these risks and agreed to plan of care.    I personally reviewed prior external notes and test results pertinent to today's visit. Red flags discussed.   Supportive care, differential diagnoses, and indications for immediate follow-up discussed with patient.    Patient expresses understanding and agrees to plan. Patient denies any other questions or concerns.     Advised the patient to follow-up with the primary care physician for recheck, reevaluation, and consideration of further management.    My total time spent caring for the patient on the day of the encounter that included review of prior records, obtaining history, examination, discussion of plan and return  precautions was at least 30 minutes.     Please note that this dictation was created using voice recognition software. I have made a reasonable attempt to correct obvious errors, but I expect that there are errors of grammar and possibly content that I did not discover before finalizing the note.    This note was electronically signed by Ady Brink PA-C

## 2021-04-26 ENCOUNTER — OFFICE VISIT (OUTPATIENT)
Dept: MEDICAL GROUP | Facility: LAB | Age: 56
End: 2021-04-26
Payer: COMMERCIAL

## 2021-04-26 VITALS
BODY MASS INDEX: 20.83 KG/M2 | DIASTOLIC BLOOD PRESSURE: 54 MMHG | SYSTOLIC BLOOD PRESSURE: 90 MMHG | RESPIRATION RATE: 16 BRPM | TEMPERATURE: 98.6 F | HEART RATE: 66 BPM | OXYGEN SATURATION: 99 % | WEIGHT: 125 LBS | HEIGHT: 65 IN

## 2021-04-26 DIAGNOSIS — Z23 NEED FOR VACCINATION: ICD-10-CM

## 2021-04-26 DIAGNOSIS — I88.9 LYMPHADENITIS: ICD-10-CM

## 2021-04-26 PROCEDURE — 90750 HZV VACC RECOMBINANT IM: CPT | Performed by: FAMILY MEDICINE

## 2021-04-26 PROCEDURE — 90471 IMMUNIZATION ADMIN: CPT | Performed by: FAMILY MEDICINE

## 2021-04-26 PROCEDURE — 90715 TDAP VACCINE 7 YRS/> IM: CPT | Performed by: FAMILY MEDICINE

## 2021-04-26 PROCEDURE — 90472 IMMUNIZATION ADMIN EACH ADD: CPT | Performed by: FAMILY MEDICINE

## 2021-04-26 PROCEDURE — 99214 OFFICE O/P EST MOD 30 MIN: CPT | Mod: 25 | Performed by: FAMILY MEDICINE

## 2021-04-26 RX ORDER — AZITHROMYCIN 250 MG/1
TABLET, FILM COATED ORAL
Qty: 6 TABLET | Refills: 0 | Status: SHIPPED | OUTPATIENT
Start: 2021-04-26 | End: 2021-05-01

## 2021-04-26 RX ORDER — AZITHROMYCIN 250 MG/1
TABLET, FILM COATED ORAL
Qty: 6 TABLET | Refills: 0 | Status: SHIPPED | OUTPATIENT
Start: 2021-04-26 | End: 2021-04-26 | Stop reason: SDUPTHER

## 2021-04-26 NOTE — PATIENT INSTRUCTIONS
Lymphangitis, Adult    Lymphangitis is inflammation of one or more lymph vessels. This condition is usually caused by an infection with bacteria. The lymphatic system is part of the body's defense system (immune system). It is a network of vessels, glands, and organs that carry fluid (lymph) and other substances around the body. Lymph vessels drain into glands called lymph nodes. These nodes remove bacteria, viruses, and waste products from lymph to keep them from spreading through the body.  Lymphangitis causes red streaks, swelling, and skin soreness in the area of the affected lymph vessels. Starting treatment right away is important because this condition can quickly get worse and lead to serious illness. It can spread quickly through your lymph system and into your blood (bacteremia).  What are the causes?  This condition is usually caused by a bacterial infection of the skin. The bacteria may enter the body through an injury to the skin, such as a cut, scratch, surgical incision, or insect bite. Lymphangitis usually results from an infection with streptococcus or staphylococcus bacteria, but it may also be caused by other infections.  What increases the risk?  The following factors may make you more likely to develop this condition:  · Being male. Men are more likely to get lymphangitis caused by cellulitis.  · Having a decreased ability to fight infection or a weakened immune system.  · Having diabetes.  · Taking drugs that suppress the immune system.  · Having chickenpox.  · Being weak from another illness.  What are the signs or symptoms?  The most common symptom of lymphangitis is a wound or skin infection that develops red streaks in the skin. These are the infected lymph vessels. The red streaks will extend toward the lymph nodes that drain the vessels.   Other symptoms may include:  · Warmth and tenderness over the streaks.  · Throbbing pain.  · Swollen and tender lymph nodes.  ? For arm infections,  these will be under the arm.  ? For leg infections, these will be in the groin area.  · Fever.  · Chills.  · Headache.  · Appetite loss.  · Muscle aches.  · Fast pulse.  How is this diagnosed?  This condition may be diagnosed based on your symptoms and a physical exam. You may also have tests, such as:  · Blood tests to check for an increase in white blood cells.  · Blood cultures to look for bacteremia.  · Culture and sensitivity testing. This is a test to find out what type of bacteria will grow from a sample of pus swabbed from the wound or skin infection. The results help determine which antibiotic medicines will kill the bacteria.  · X-rays. These may be needed if you have a red or swollen joint. In this case, you may also be referred to a bone specialist.  How is this treated?  Treatment for this condition may include:  · Antibiotics.  ? You may be started on an antibiotic that is known to kill both streptococcus and staphylococcus bacteria.  ? Your antibiotics may need to be switched if tests show that your condition is caused by another type of bacteria.  ? If your infection is very bad or has spread to another area of your body, you may need to get antibiotics given directly into a vein through an IV at the hospital.  · Pain medicine.  · Incision and drainage. This is a procedure that may be done at the hospital if pus needs to be drained from your wound.  Follow these instructions at home:  · Take over-the-counter and prescription medicines only as told by your health care provider.  · Take your antibiotic medicine as told by your health care provider. Do not stop taking the antibiotic even if you start to feel better.  · Rest at home until your health care provider says that you can return to your normal activities.  · Drink enough fluid to keep your urine pale yellow.  · Follow instructions from your health care provider about how to take care of any wound.  · Raise (elevate) the affected area above the  level of your heart while you are sitting or lying down.  · Keep all follow-up visits as told by your health care provider. This is important.  Contact a health care provider if:  · You have chills or a fever.  · Your symptoms do not go away or they get worse with treatment.  · Your symptoms come back after treatment.  Get help right away if you have:  · A headache or a stiff neck.  · Chest pain.  · Trouble breathing.  Summary  · Lymphangitis is inflammation of one or more lymph vessels. It is usually caused by a bacterial infection.  · Take your antibiotic medicine as told by your health care provider. Do not stop taking the antibiotic even if you start to feel better.  · Rest and drink plenty of fluids.  · Keep all follow-up visits as told by your health care provider. This is important.  This information is not intended to replace advice given to you by your health care provider. Make sure you discuss any questions you have with your health care provider.  Document Released: 01/13/2017 Document Revised: 09/23/2019 Document Reviewed: 09/23/2019  [employer] Patient Education © 2020 [employer]

## 2021-05-04 NOTE — PROGRESS NOTES
"Subjective:     Chief Complaint   Patient presents with   • Urgent Care/ ED Follow-up     swelling has not subsided   • Immunizations       Rosalva Cummings is a 56 y.o. female here today for evaluation and management of:    She was seen in the urgent care on 4/17/2021 with swollen lymph nodes.  No treatment was given at that time.  Patient reports that the swelling has not improved.  She denies any fever or chills.  No nausea or vomiting.  She is on minocycline for her acne and has had a recent flare.    Allergies   Allergen Reactions   • Doxycycline Vomiting   • Hydrocodone-Acetaminophen Vomiting   • Sulfamethoxazole-Trimethoprim Rash   • Amoxicillin Rash     Per patient   • Codeine        Current medicines (including changes today)  Current Outpatient Medications   Medication Sig Dispense Refill   • minocycline (MINOCIN) 50 MG Cap TAKE 1 CAPSULE TWICE A  Cap 1   • citalopram (CELEXA) 40 MG Tab TAKE 1 TABLET DAILY (NEEDS APPOINTMENT FOR FUTURE REFILLS) 90 Tab 3   • valacyclovir (VALTREX) 1 GM Tab TAKE TWO TABLETS BY MOUTH TWICE A DAY FOR ONE DAY *VALTREX* 24 Tab 0     No current facility-administered medications for this visit.       She  has a past medical history of Acne, Depression, and Ovarian cyst.    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  No nausea or vomiting.  No chest pain or palpitations.  No cough or SOB.  No pain with urination or hematuria.  No black or bloody stools.       Objective:     BP (!) 90/54 (BP Location: Right arm, Patient Position: Sitting, BP Cuff Size: Adult)   Pulse 66   Temp 37 °C (98.6 °F) (Temporal)   Resp 16   Ht 1.651 m (5' 5\")   Wt 56.7 kg (125 lb)   " SpO2 99%  Body mass index is 20.8 kg/m².   Physical Exam:  Well developed, well nourished.  Alert, oriented in no acute distress.  Eye contact is good, speech goal directed, affect calm  Eyes: conjunctiva non-injected, sclera non-icteric.  Ears: Pinna normal. TM pearly gray.   Nose: Nares are patent.  Normal mucosa  Mouth: Oral mucous membranes pink and moist with no lesions.  Neck Supple.  Right-sided preauricular lymphadenopathy as well as anterior chain on the right.  No fluctuance or redness. no thyromegaly  Lungs: clear to auscultation bilaterally with good excursion. No wheezes or rhonchi  CV: regular rate and rhythm. No murmur            Assessment and Plan:   The following treatment plan was discussed    1. Lymphadenitis  As her symptoms have persisted we will start her on Zithromax as directed.  She is allergic to cephalosporins and sulfa.  Warm pack to area.  Patient to call if not improving  - azithromycin (ZITHROMAX) 250 MG Tab; 2 tabs by mouth day 1, 1 tab by mouth days 2-5  Dispense: 6 tablet; Refill: 0    2. Need for vaccination    - Shingrix Vaccine  - Tdap Vaccine =>8YO IM    Any change or worsening of signs or symptoms, patient encouraged to follow-up or report to the emergency room for further evaluation. Patient understands and agrees.    Followup: Return if symptoms worsen or fail to improve.

## 2022-09-26 ENCOUNTER — APPOINTMENT (RX ONLY)
Dept: URBAN - METROPOLITAN AREA CLINIC 150 | Facility: CLINIC | Age: 57
Setting detail: DERMATOLOGY
End: 2022-09-26

## 2022-09-26 DIAGNOSIS — Z71.89 OTHER SPECIFIED COUNSELING: ICD-10-CM

## 2022-09-26 DIAGNOSIS — D22 MELANOCYTIC NEVI: ICD-10-CM

## 2022-09-26 DIAGNOSIS — L57.8 OTHER SKIN CHANGES DUE TO CHRONIC EXPOSURE TO NONIONIZING RADIATION: ICD-10-CM

## 2022-09-26 DIAGNOSIS — L82.0 INFLAMED SEBORRHEIC KERATOSIS: ICD-10-CM

## 2022-09-26 DIAGNOSIS — L81.4 OTHER MELANIN HYPERPIGMENTATION: ICD-10-CM

## 2022-09-26 DIAGNOSIS — D18.0 HEMANGIOMA: ICD-10-CM

## 2022-09-26 PROBLEM — D18.01 HEMANGIOMA OF SKIN AND SUBCUTANEOUS TISSUE: Status: ACTIVE | Noted: 2022-09-26

## 2022-09-26 PROBLEM — D22.5 MELANOCYTIC NEVI OF TRUNK: Status: ACTIVE | Noted: 2022-09-26

## 2022-09-26 PROCEDURE — ? SUNSCREEN RECOMMENDATIONS

## 2022-09-26 PROCEDURE — 17110 DESTRUCTION B9 LES UP TO 14: CPT

## 2022-09-26 PROCEDURE — ? COUNSELING

## 2022-09-26 PROCEDURE — ? LIQUID NITROGEN

## 2022-09-26 PROCEDURE — ? PRESCRIPTION

## 2022-09-26 PROCEDURE — 99203 OFFICE O/P NEW LOW 30 MIN: CPT | Mod: 25

## 2022-09-26 RX ORDER — TRETIONIN 0.25 MG/G
CREAM TOPICAL
Qty: 20 | Refills: 6 | Status: ERX | COMMUNITY
Start: 2022-09-26

## 2022-09-26 RX ADMIN — TRETIONIN: 0.25 CREAM TOPICAL at 00:00

## 2022-09-26 ASSESSMENT — LOCATION SIMPLE DESCRIPTION DERM
LOCATION SIMPLE: RIGHT LOWER BACK
LOCATION SIMPLE: LEFT FOREARM
LOCATION SIMPLE: LEFT HAND
LOCATION SIMPLE: ABDOMEN
LOCATION SIMPLE: LEFT UPPER BACK
LOCATION SIMPLE: RIGHT FOREARM

## 2022-09-26 ASSESSMENT — LOCATION DETAILED DESCRIPTION DERM
LOCATION DETAILED: RIGHT DISTAL DORSAL FOREARM
LOCATION DETAILED: LEFT MEDIAL UPPER BACK
LOCATION DETAILED: LEFT DISTAL DORSAL FOREARM
LOCATION DETAILED: RIGHT PROXIMAL DORSAL FOREARM
LOCATION DETAILED: EPIGASTRIC SKIN
LOCATION DETAILED: LEFT RADIAL DORSAL HAND
LOCATION DETAILED: RIGHT SUPERIOR MEDIAL MIDBACK

## 2022-09-26 ASSESSMENT — LOCATION ZONE DERM
LOCATION ZONE: ARM
LOCATION ZONE: TRUNK
LOCATION ZONE: HAND

## 2022-09-26 NOTE — PROCEDURE: LIQUID NITROGEN
Consent: The patient's consent was obtained including but not limited to risks of crusting, scabbing, blistering, scarring, darker or lighter pigmentary change, recurrence, incomplete removal and infection.
Detail Level: Simple
Post-Care Instructions: I reviewed with the patient in detail post-care instructions. Patient is to wear sunprotection, and avoid picking at any of the treated lesions. Pt may apply Vaseline to crusted or scabbing areas.
Show Spray Paint Technique Variable?: Yes
Include Z78.9 (Other Specified Conditions Influencing Health Status) As An Associated Diagnosis?: No
Duration Of Freeze Thaw-Cycle (Seconds): 5-10
Number Of Freeze-Thaw Cycles: 2 freeze-thaw cycles
Medical Necessity Clause: This procedure was medically necessary because the lesions that were treated were:
Medical Necessity Information: It is in your best interest to select a reason for this procedure from the list below. All of these items fulfill various CMS LCD requirements except the new and changing color options.
Spray Paint Text: The liquid nitrogen was applied to the skin utilizing a spray paint frosting technique.